# Patient Record
Sex: FEMALE | Race: OTHER | HISPANIC OR LATINO | URBAN - METROPOLITAN AREA
[De-identification: names, ages, dates, MRNs, and addresses within clinical notes are randomized per-mention and may not be internally consistent; named-entity substitution may affect disease eponyms.]

---
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
## 2025-04-23 NOTE — ED BEHAVIORAL HEALTH ASSESSMENT NOTE - COLLATERAL SOURCE
Continue Intuniv 2 mg for impulsivity  Continue Abilify 5 mg daily for mood  Continue Prozac 20 mg daily for depression and anxiety  Continue with therapeutic services-father reports there is a new CMO team spending time in the home  Continue to review safety planning with parents: If there are any unsafe ideation or behaviors they should call 911 or take patient to the nearest emergency room for evaluation   Personal collateral